# Patient Record
(demographics unavailable — no encounter records)

---

## 2025-01-16 NOTE — HISTORY OF PRESENT ILLNESS
[Mother] : mother [2%] : 2%  milk  [Fruit] : fruit [Vegetables] : vegetables [Meat] : meat [Grains] : grains [Eggs] : eggs [Fish] : fish [Dairy] : dairy [Vitamins] : takes vitamins  [Eats healthy meals and snacks] : eats healthy meals and snacks [Eats meals with family] : eats meals with family [___ stools per day] : [unfilled]  stools per day [Firm] : stools are firm consistency [___ voids per day] : [unfilled] voids per day [Toilet Trained] : toilet trained [Normal] : Normal [In own bed] : In own bed [Sleeps ___ hours per night] : sleeps [unfilled] hours per night [Brushing teeth twice/d] : brushing teeth twice per day [Yes] : Patient goes to dentist yearly [Toothpaste] : Primary Fluoride Source: Toothpaste [Playtime (60 min/d)] : playtime 60 min a day [Participates in after-school activities] : participates in after-school activities [Appropiate parent-child-sibling interaction] : appropriate parent-child-sibling interaction [Does chores when asked] : does chores when asked [Has Friends] : has friends [Grade ___] : Grade [unfilled] [Adequate social interactions] : adequate social interactions [Adequate behavior] : adequate behavior [Adequate performance] : adequate performance [Adequate attention] : adequate attention [No difficulties with Homework] : no difficulties with homework [No] : No cigarette smoke exposure [Appropriately restrained in motor vehicle] : appropriately restrained in motor vehicle [Supervised outdoor play] : supervised outdoor play [Supervised around water] : supervised around water [Wears helmet and pads] : wears helmet and pads [Parent knows child's friends] : parent knows child's friends [Parent discusses safety rules regarding adults] : parent discusses safety rules regarding adults [Monitored computer use] : monitored computer use [Family discusses home emergency plan] : family discusses home emergency plan [Exposure to electronic nicotine delivery system] : No exposure to electronic nicotine delivery system [Up to date] : Up to date [NO] : No

## 2025-06-09 NOTE — PHYSICAL EXAM
[NL] : warm, clear [Erythematous] : erythematous [de-identified] : tick bite on back - about half inch ELIZABETH HENAO

## 2025-06-09 NOTE — DISCUSSION/SUMMARY
[FreeTextEntry1] : tick bite - tick removed with under 48 hours of attachment  one dose of doxycycline  4.4 mg /kg    follow up labs in 3-4 weeks

## 2025-06-09 NOTE — HISTORY OF PRESENT ILLNESS
[FreeTextEntry6] : Patient is here today because of a tick bite to his back.  He went [facility] on Saturday and returned late yesterday. child was complaining that his back was itchy. mom noticed a tick and took a picture (looks like a deer tick). Dad removed the tick but it was in pieces  so didn't bring it.  there has been no more itch.   no fever.  no medication given.